# Patient Record
Sex: MALE | Race: WHITE | NOT HISPANIC OR LATINO | Employment: FULL TIME | URBAN - METROPOLITAN AREA
[De-identification: names, ages, dates, MRNs, and addresses within clinical notes are randomized per-mention and may not be internally consistent; named-entity substitution may affect disease eponyms.]

---

## 2017-05-26 ENCOUNTER — TELEPHONE (OUTPATIENT)
Dept: FAMILY MEDICINE | Facility: CLINIC | Age: 47
End: 2017-05-26

## 2017-05-26 NOTE — TELEPHONE ENCOUNTER
Reason for Call:  Other appointment    Detailed comments: wife Lashell called to set up new patient appointment for Darien with Dr Owusu, he was referred by Dr Joss Owusu. Ok to be new patient?      Phone Number Patient can be reached at: Other phone number:  [phone] # for Lashell    Best Time: any    Can we leave a detailed message on this number? YES    Call taken on 5/26/2017 at 9:40 AM by Shima Valverde

## 2017-06-05 ENCOUNTER — OFFICE VISIT (OUTPATIENT)
Dept: FAMILY MEDICINE | Facility: CLINIC | Age: 47
End: 2017-06-05
Payer: COMMERCIAL

## 2017-06-05 VITALS
SYSTOLIC BLOOD PRESSURE: 129 MMHG | HEIGHT: 69 IN | TEMPERATURE: 97.5 F | WEIGHT: 191 LBS | BODY MASS INDEX: 28.29 KG/M2 | HEART RATE: 81 BPM | OXYGEN SATURATION: 97 % | DIASTOLIC BLOOD PRESSURE: 93 MMHG

## 2017-06-05 DIAGNOSIS — R19.7 DIARRHEA, UNSPECIFIED TYPE: ICD-10-CM

## 2017-06-05 DIAGNOSIS — R10.11 RUQ ABDOMINAL PAIN: Primary | ICD-10-CM

## 2017-06-05 LAB
ALBUMIN SERPL-MCNC: 4 G/DL (ref 3.4–5)
ALBUMIN UR-MCNC: NEGATIVE MG/DL
ALP SERPL-CCNC: 84 U/L (ref 40–150)
ALT SERPL W P-5'-P-CCNC: 69 U/L (ref 0–70)
AMYLASE SERPL-CCNC: 29 U/L (ref 30–110)
ANION GAP SERPL CALCULATED.3IONS-SCNC: 7 MMOL/L (ref 3–14)
APPEARANCE UR: CLEAR
AST SERPL W P-5'-P-CCNC: 33 U/L (ref 0–45)
BILIRUB DIRECT SERPL-MCNC: 0.2 MG/DL (ref 0–0.2)
BILIRUB SERPL-MCNC: 0.6 MG/DL (ref 0.2–1.3)
BILIRUB UR QL STRIP: NEGATIVE
BUN SERPL-MCNC: 14 MG/DL (ref 7–30)
CALCIUM SERPL-MCNC: 8.6 MG/DL (ref 8.5–10.1)
CHLORIDE SERPL-SCNC: 107 MMOL/L (ref 94–109)
CO2 SERPL-SCNC: 25 MMOL/L (ref 20–32)
COLOR UR AUTO: YELLOW
CREAT SERPL-MCNC: 0.87 MG/DL (ref 0.66–1.25)
ERYTHROCYTE [DISTWIDTH] IN BLOOD BY AUTOMATED COUNT: 13 % (ref 10–15)
GFR SERPL CREATININE-BSD FRML MDRD: NORMAL ML/MIN/1.7M2
GLUCOSE SERPL-MCNC: 96 MG/DL (ref 70–99)
GLUCOSE UR STRIP-MCNC: NEGATIVE MG/DL
HCT VFR BLD AUTO: 46.9 % (ref 40–53)
HGB BLD-MCNC: 15.7 G/DL (ref 13.3–17.7)
HGB UR QL STRIP: NEGATIVE
KETONES UR STRIP-MCNC: NEGATIVE MG/DL
LEUKOCYTE ESTERASE UR QL STRIP: NEGATIVE
LIPASE SERPL-CCNC: 152 U/L (ref 73–393)
MCH RBC QN AUTO: 30.4 PG (ref 26.5–33)
MCHC RBC AUTO-ENTMCNC: 33.5 G/DL (ref 31.5–36.5)
MCV RBC AUTO: 91 FL (ref 78–100)
NITRATE UR QL: NEGATIVE
PH UR STRIP: 5 PH (ref 5–7)
PLATELET # BLD AUTO: 235 10E9/L (ref 150–450)
POTASSIUM SERPL-SCNC: 4.6 MMOL/L (ref 3.4–5.3)
PROT SERPL-MCNC: 7 G/DL (ref 6.8–8.8)
RBC # BLD AUTO: 5.17 10E12/L (ref 4.4–5.9)
SODIUM SERPL-SCNC: 139 MMOL/L (ref 133–144)
SP GR UR STRIP: 1.01 (ref 1–1.03)
URN SPEC COLLECT METH UR: NORMAL
UROBILINOGEN UR STRIP-ACNC: 0.2 EU/DL (ref 0.2–1)
WBC # BLD AUTO: 6.1 10E9/L (ref 4–11)

## 2017-06-05 PROCEDURE — 81003 URINALYSIS AUTO W/O SCOPE: CPT | Performed by: INTERNAL MEDICINE

## 2017-06-05 PROCEDURE — 80048 BASIC METABOLIC PNL TOTAL CA: CPT | Performed by: INTERNAL MEDICINE

## 2017-06-05 PROCEDURE — 80076 HEPATIC FUNCTION PANEL: CPT | Performed by: INTERNAL MEDICINE

## 2017-06-05 PROCEDURE — 83690 ASSAY OF LIPASE: CPT | Performed by: INTERNAL MEDICINE

## 2017-06-05 PROCEDURE — 85027 COMPLETE CBC AUTOMATED: CPT | Performed by: INTERNAL MEDICINE

## 2017-06-05 PROCEDURE — 82150 ASSAY OF AMYLASE: CPT | Performed by: INTERNAL MEDICINE

## 2017-06-05 PROCEDURE — 99204 OFFICE O/P NEW MOD 45 MIN: CPT | Performed by: INTERNAL MEDICINE

## 2017-06-05 PROCEDURE — 36415 COLL VENOUS BLD VENIPUNCTURE: CPT | Performed by: INTERNAL MEDICINE

## 2017-06-05 RX ORDER — ALBUTEROL SULFATE 90 UG/1
1 AEROSOL, METERED RESPIRATORY (INHALATION) EVERY 4 HOURS PRN
COMMUNITY

## 2017-06-05 NOTE — PATIENT INSTRUCTIONS
(R10.11) RUQ abdominal pain  (primary encounter diagnosis)  Comment: We will plan to get labs checked today and refer for abdominal CT [facility] phone #[phone].  We will send stool sample cultures home with you today.  Bring cultures back at your convenience.  Plan: Lipase, Amylase, Hepatic panel, Basic metabolic        panel, UA reflex to Microscopic and Culture, CT        Abdomen Pelvis w Contrast            (R19.7) Diarrhea, unspecified type  Comment: as above   Plan: Lipase, Amylase, Hepatic panel, Basic metabolic        panel, UA reflex to Microscopic and Culture, CT        Abdomen Pelvis w Contrast

## 2017-06-05 NOTE — MR AVS SNAPSHOT
After Visit Summary   6/5/2017    Darien Johnson    MRN: [id number]           Patient Information     Date Of Birth          1970        Visit Information        Provider Department      6/5/2017 10:00 AM Erich Owusu MD [facility]        Today's Diagnoses     RUQ abdominal pain    -  1    Diarrhea, unspecified type          Care Instructions    (R10.11) RUQ abdominal pain  (primary encounter diagnosis)  Comment: We will plan to get labs checked today and refer for abdominal CT [facility] phone #[phone].  We will send stool sample cultures home with you today.  Bring cultures back at your convenience.  Plan: Lipase, Amylase, Hepatic panel, Basic metabolic        panel, UA reflex to Microscopic and Culture, CT        Abdomen Pelvis w Contrast            (R19.7) Diarrhea, unspecified type  Comment: as above   Plan: Lipase, Amylase, Hepatic panel, Basic metabolic        panel, UA reflex to Microscopic and Culture, CT        Abdomen Pelvis w Contrast                     Follow-ups after your visit        Future tests that were ordered for you today     Open Future Orders        Priority Expected Expires Ordered    Clostridium difficile Toxin B PCR Routine  9/5/2017 6/5/2017    Enteric Bacteria and Virus Panel by MELISA Stool Routine  6/5/2018 6/5/2017    CT Abdomen Pelvis w Contrast Routine  6/5/2018 6/5/2017            Who to contact     If you have questions or need follow up information about today's clinic visit or your schedule please contact [facility] directly at [phone].  Normal or non-critical lab and imaging results will be communicated to you by MyChart, letter or phone within 4 business days after the clinic has received the results. If you do not hear from us within 7 days, please contact the clinic through MyChart or phone. If you have a critical or abnormal lab result, we will notify you by phone as soon as possible.  Submit refill  "requests through Healthkart or call your pharmacy and they will forward the refill request to us. Please allow 3 business days for your refill to be completed.          Additional Information About Your Visit        [employer] Information     Healthkart lets you send messages to your doctor, view your test results, renew your prescriptions, schedule appointments and more. To sign up, go to [email or web address] . Click on \"Log in\" on the left side of the screen, which will take you to the Welcome page. Then click on \"Sign up Now\" on the right side of the page.     You will be asked to enter the access code listed below, as well as some personal information. Please follow the directions to create your username and password.     Your access code is: [email or web address]  Expires: 9/3/2017 10:25 AM     Your access code will  in 90 days. If you need help or a new code, please call your [facility] clinic or [phone].        Care EveryWhere ID     This is your Care EveryWhere ID. This could be used by other organizations to access your [facility] medical records  [id number]        Your Vitals Were     Pulse Temperature Height Pulse Oximetry BMI (Body Mass Index)       81 97.5  F (36.4  C) 5' 9\" (1.753 m) 97% 28.21 kg/m2        Blood Pressure from Last 3 Encounters:   17 (!) 129/93    Weight from Last 3 Encounters:   17 191 lb (86.6 kg)              We Performed the Following     Amylase     Basic metabolic panel     CBC with platelets     Hepatic panel     Lipase     UA reflex to Microscopic and Culture        Primary Care Provider    None Specified       No primary provider on file.        Thank you!     Thank you for choosing [facility]  for your care. Our goal is always to provide you with excellent care. Hearing back from our patients is one way we can continue to improve our services. Please take a few minutes to complete the written survey that you may receive in the mail after your visit with us. " Thank you!             Your Updated Medication List - Protect others around you: Learn how to safely use, store and throw away your medicines at www.disposemymeds.org.          This list is accurate as of: 6/5/17 10:26 AM.  Always use your most recent med list.                   Brand Name Dispense Instructions for use    albuterol 108 (90 BASE) MCG/ACT Inhaler    PROAIR HFA/PROVENTIL HFA/VENTOLIN HFA     Inhale 1 puff into the lungs every 4 hours as needed for shortness of breath / dyspnea or wheezing

## 2017-06-05 NOTE — PROGRESS NOTES
"[facility]  CLINIC PROGRESS NOTE    Subjective:  Establishment of primary care   Darien Johnson presents to the clinic today for establishment of primary care.    Loose stools   Darien Johnson has had two weeks of indigestion, diarrhea and lower energy.  He notes that he has had lower back pain as well and noticeable dark urine.  He relates that his nausea comes and goes and seems to be relatively related to eating.  His stomach is making some noises.  Loose stools initially every 15 minutes and has not had a normal stool since two weeks.  Little feverish.  Slight heart burn.    Past medical history, medications, allergies, social history, family history reviewed and updated in [facility] as of 6/5/2017 .    ROS  CONSTITUTIONAL: + fatigue, possibly 10 pound weight loss  SKIN: no worrisome rashes, no worrisome moles, no worrisome lesions  EYES: no acute vision problems or changes  ENT: no ear problems, no mouth problems, no throat problems  RESP: no significant cough, no shortness of breath  CV: no chest pain, no palpitations, no new or worsening peripheral edema  GI: as above   : dark urine, no frequency, no dysuria, no hematuria  MS: no claudication, no myalgias, no joint aches  PSYCHIATRIC: no changes in mood or affect      Objective:  Vitals  BP (!) 129/93  Pulse 81  Temp 97.5  F (36.4  C)  Ht 5' 9\" (1.753 m)  Wt 191 lb (86.6 kg)  SpO2 97%  BMI 28.21 kg/m2  GEN: Alert Oriented x3 NAD  HEENT: Atraumatic, normocephalic, neck supple, no thyromegaly, negative cervical adenopathy  TM: TM bilaterally pearly and grey with normal light reflex  CV: RRR no murmurs or rubs  PULM: CTA no wheezes or crackles  ABD: right upper quadrant tenderness with palpation, no hepatosplenomegally  SKIN: No visible skin lesion or ulcerations  EXT: 2+ dorsal pedis pulses, no edema bilateral lower extremities  NEURO: Gait and station deferred, No focal neurologic deficits  PSYCH: Mood good, affect mood congruent    Results for " orders placed or performed in visit on 06/05/17 (from the past 24 hour(s))   Lipase   Result Value Ref Range    Lipase 152 73 - 393 U/L   Amylase   Result Value Ref Range    Amylase 29 (L) 30 - 110 U/L   Hepatic panel   Result Value Ref Range    Bilirubin Direct 0.2 0.0 - 0.2 mg/dL    Bilirubin Total 0.6 0.2 - 1.3 mg/dL    Albumin 4.0 3.4 - 5.0 g/dL    Protein Total 7.0 6.8 - 8.8 g/dL    Alkaline Phosphatase 84 40 - 150 U/L    ALT 69 0 - 70 U/L    AST 33 0 - 45 U/L   Basic metabolic panel   Result Value Ref Range    Sodium 139 133 - 144 mmol/L    Potassium 4.6 3.4 - 5.3 mmol/L    Chloride 107 94 - 109 mmol/L    Carbon Dioxide 25 20 - 32 mmol/L    Anion Gap 7 3 - 14 mmol/L    Glucose 96 70 - 99 mg/dL    Urea Nitrogen 14 7 - 30 mg/dL    Creatinine 0.87 0.66 - 1.25 mg/dL    GFR Estimate >90  Non  GFR Calc   >60 mL/min/1.7m2    GFR Estimate If Black >90   GFR Calc   >60 mL/min/1.7m2    Calcium 8.6 8.5 - 10.1 mg/dL   UA reflex to Microscopic and Culture   Result Value Ref Range    Color Urine Yellow     Appearance Urine Clear     Glucose Urine Negative NEG mg/dL    Bilirubin Urine Negative NEG    Ketones Urine Negative NEG mg/dL    Specific Gravity Urine 1.015 1.003 - 1.035    Blood Urine Negative NEG    pH Urine 5.0 5.0 - 7.0 pH    Protein Albumin Urine Negative NEG mg/dL    Urobilinogen Urine 0.2 0.2 - 1.0 EU/dL    Nitrite Urine Negative NEG    Leukocyte Esterase Urine Negative NEG    Source Midstream Urine    CBC with platelets   Result Value Ref Range    WBC 6.1 4.0 - 11.0 10e9/L    RBC Count 5.17 4.4 - 5.9 10e12/L    Hemoglobin 15.7 13.3 - 17.7 g/dL    Hematocrit 46.9 40.0 - 53.0 %    MCV 91 78 - 100 fl    MCH 30.4 26.5 - 33.0 pg    MCHC 33.5 31.5 - 36.5 g/dL    RDW 13.0 10.0 - 15.0 %    Platelet Count 235 150 - 450 10e9/L       Assessment/Plan:  Patient Instructions   (R10.11) RUQ abdominal pain  (primary encounter diagnosis)  Comment: We will plan to get labs checked today and refer  for abdominal CT [facility] phone #[phone].  We will send stool sample cultures home with you today.  Bring cultures back at your convenience.  Plan: Lipase, Amylase, Hepatic panel, Basic metabolic        panel, UA reflex to Microscopic and Culture, CT        Abdomen Pelvis w Contrast            (R19.7) Diarrhea, unspecified type  Comment: as above   Plan: Lipase, Amylase, Hepatic panel, Basic metabolic        panel, UA reflex to Microscopic and Culture, CT        Abdomen Pelvis w Contrast               Follow up pending labs and CT    Disclaimer: This note consists of symbols derived from keyboarding, dictation and/or voice recognition software. As a result, there may be errors in the script that have gone undetected. Please consider this when interpreting information found in this chart.    Erich Owusu MD  [phone]

## 2017-06-05 NOTE — LETTER
[address]  [place] [address]  [phone]                                                                                               Date: 6/6/2017    Darien Johnson                                                                               [address]  [place] [address]              Dear Darien,    I have had the opportunity to review your recent labs and they are as follows:  Your urinalysis returned within normal limits  Your basic metabolic panel shows stable renal function and electrolytes  Your liver function tests also returned within normal limits  Your pancreatic enzymes all came back within normal limits  Enclosed is a copy of your results.      It was a pleasure to see you at your last appointment. If you have any questions, please feel free to call myself or my nurse at [phone].          Sincerely,    Erich Owusu MD/ Yesenia SUGGS CMA

## 2017-06-06 ENCOUNTER — TELEPHONE (OUTPATIENT)
Dept: FAMILY MEDICINE | Facility: CLINIC | Age: 47
End: 2017-06-06

## 2017-06-06 DIAGNOSIS — R19.7 DIARRHEA, UNSPECIFIED TYPE: ICD-10-CM

## 2017-06-06 LAB
C DIFF TOX B STL QL: NORMAL
CAMPYLOBACTER GROUP BY NAT: NOT DETECTED
ENTERIC PATHOGEN COMMENT: NORMAL
NOROVIRUS I AND II BY NAT: NOT DETECTED
ROTAVIRUS A BY NAT: NOT DETECTED
SALMONELLA SPECIES BY NAT: NOT DETECTED
SHIGA TOXIN 1 GENE BY NAT: NOT DETECTED
SHIGA TOXIN 2 GENE BY NAT: NOT DETECTED
SHIGELLA SP+EIEC IPAH STL QL NAA+PROBE: NOT DETECTED
SPECIMEN SOURCE: NORMAL
VIBRIO GROUP BY NAT: NOT DETECTED
YERSINIA ENTEROCOLITICA BY NAT: NOT DETECTED

## 2017-06-06 PROCEDURE — 87493 C DIFF AMPLIFIED PROBE: CPT | Mod: 59 | Performed by: INTERNAL MEDICINE

## 2017-06-06 PROCEDURE — 87506 IADNA-DNA/RNA PROBE TQ 6-11: CPT | Performed by: INTERNAL MEDICINE

## 2017-06-06 NOTE — TELEPHONE ENCOUNTER
Can we call Darien Johnson and let him know that     I have had the opportunity to review your recent labs and they are as follows:  Your urinalysis returned within normal limits  Your basic metabolic panel shows stable renal function and electrolytes  Your liver function tests also returned within normal limits  Your pancreatic enzymes all came back within normal limits    We will await stool cultures and upcoming CT

## 2017-06-07 ENCOUNTER — HOSPITAL ENCOUNTER (OUTPATIENT)
Dept: CT IMAGING | Facility: CLINIC | Age: 47
Discharge: HOME | End: 2017-06-07
Attending: INTERNAL MEDICINE | Admitting: INTERNAL MEDICINE
Payer: COMMERCIAL

## 2017-06-07 DIAGNOSIS — R10.11 RUQ ABDOMINAL PAIN: ICD-10-CM

## 2017-06-07 DIAGNOSIS — R19.7 DIARRHEA, UNSPECIFIED TYPE: ICD-10-CM

## 2017-06-07 PROCEDURE — 25000125 ZZHC RX 250: Performed by: INTERNAL MEDICINE

## 2017-06-07 PROCEDURE — 25000128 H RX IP 250 OP 636: Performed by: INTERNAL MEDICINE

## 2017-06-07 PROCEDURE — 74177 CT ABD & PELVIS W/CONTRAST: CPT

## 2017-06-07 RX ORDER — IOPAMIDOL 755 MG/ML
96 INJECTION, SOLUTION INTRAVASCULAR ONCE
Status: COMPLETED | OUTPATIENT
Start: 2017-06-07 | End: 2017-06-07

## 2017-06-07 RX ADMIN — SODIUM CHLORIDE 69 ML: 9 INJECTION, SOLUTION INTRAVENOUS at 10:43

## 2017-06-07 RX ADMIN — IOPAMIDOL 96 ML: 755 INJECTION, SOLUTION INTRAVENOUS at 10:43

## 2017-06-07 NOTE — TELEPHONE ENCOUNTER
Reason for Call:  Request for results:    Name of test or procedure: Lab Work    Date of test of procedure: 6/6/17    Location of the test or procedure: [facility]    OK to leave the result message on voice mail or with a family member? NO    Phone number Patient can be reached at:  Work number on file:  [phone]    Additional comments: please call before 9:30am or after 1:00pm.  Pt. Has been sick for over a month and really wants to review results with doctor or a nurse    Call taken on 6/7/2017 at 8:36 AM by Rosa Marmolejo.

## 2017-06-07 NOTE — PROGRESS NOTES
Arthur Ledezma,    I have had the opportunity to review your recent results and an interpretation is as follows:  Your stool studies returned within normal limits      Sincerely,  Erich Owusu MD

## 2017-06-09 ENCOUNTER — TELEPHONE (OUTPATIENT)
Dept: FAMILY MEDICINE | Facility: CLINIC | Age: 47
End: 2017-06-09

## 2017-06-09 DIAGNOSIS — R19.7 DIARRHEA, UNSPECIFIED TYPE: Primary | ICD-10-CM

## 2017-06-09 NOTE — TELEPHONE ENCOUNTER
Reason for Call:  Other call back    Detailed comments: pt would like to speak with nurse in regards to ct results. Please give him a call. States doesn't want to wait the whole weekend before hearing about these results. Thanks!    Phone Number Patient can be reached at: other-[phone]    Best Time: anytime    Can we leave a detailed message on this number? YES    Call taken on 6/9/2017 at 2:28 PM by Brianna Wren

## 2017-06-09 NOTE — TELEPHONE ENCOUNTER
This has not yet been resulted by a provider. Routing to [employer] or on-call    Catarino Camejo CMA

## 2017-06-12 NOTE — TELEPHONE ENCOUNTER
I spoke to Darien Johnson and although his symptoms had improved prior to the weekend, his diarrhea did return and as such I recommended he schedule a consultation with gastroenterology and ultimately he was advised that he will need a colonoscopy this summer.    Erich Owusu MD

## 2017-06-13 NOTE — PROGRESS NOTES
Arthur Ledezma,    I have had the opportunity to review your recent results and an interpretation is as follows:  As we discussed a follow up in gastroenterology is recommended     [facility] [phone] to schedule     Sincerely,  Erich Owusu MD

## 2017-06-14 ENCOUNTER — TELEPHONE (OUTPATIENT)
Dept: FAMILY MEDICINE | Facility: CLINIC | Age: 47
End: 2017-06-14

## 2017-06-14 DIAGNOSIS — M10.9 ACUTE GOUTY ARTHRITIS: Primary | ICD-10-CM

## 2017-06-15 PROBLEM — M10.9 ACUTE GOUTY ARTHRITIS: Status: ACTIVE | Noted: 2017-06-15

## 2017-06-15 RX ORDER — INDOMETHACIN 25 MG/1
25 CAPSULE ORAL 2 TIMES DAILY WITH MEALS
Qty: 42 CAPSULE | Refills: 1 | COMMUNITY
Start: 2017-06-15 | End: 2017-06-20

## 2017-06-16 ENCOUNTER — TRANSFERRED RECORDS (OUTPATIENT)
Dept: HEALTH INFORMATION MANAGEMENT | Facility: CLINIC | Age: 47
End: 2017-06-16

## 2017-06-20 ENCOUNTER — OFFICE VISIT (OUTPATIENT)
Dept: FAMILY MEDICINE | Facility: CLINIC | Age: 47
End: 2017-06-20
Payer: COMMERCIAL

## 2017-06-20 ENCOUNTER — MYC MEDICAL ADVICE (OUTPATIENT)
Dept: FAMILY MEDICINE | Facility: CLINIC | Age: 47
End: 2017-06-20

## 2017-06-20 VITALS
SYSTOLIC BLOOD PRESSURE: 128 MMHG | TEMPERATURE: 97.6 F | BODY MASS INDEX: 27.7 KG/M2 | HEIGHT: 69 IN | OXYGEN SATURATION: 96 % | HEART RATE: 90 BPM | DIASTOLIC BLOOD PRESSURE: 92 MMHG | WEIGHT: 187 LBS

## 2017-06-20 DIAGNOSIS — M10.9 ACUTE GOUTY ARTHRITIS: Primary | ICD-10-CM

## 2017-06-20 DIAGNOSIS — M10.9 ACUTE GOUTY ARTHRITIS: ICD-10-CM

## 2017-06-20 PROCEDURE — 84550 ASSAY OF BLOOD/URIC ACID: CPT | Performed by: INTERNAL MEDICINE

## 2017-06-20 PROCEDURE — 36415 COLL VENOUS BLD VENIPUNCTURE: CPT | Performed by: INTERNAL MEDICINE

## 2017-06-20 PROCEDURE — 99213 OFFICE O/P EST LOW 20 MIN: CPT | Performed by: INTERNAL MEDICINE

## 2017-06-20 RX ORDER — PREDNISONE 20 MG/1
TABLET ORAL
Qty: 12 TABLET | Refills: 0 | Status: SHIPPED | OUTPATIENT
Start: 2017-06-20 | End: 2017-06-26

## 2017-06-20 RX ORDER — INDOMETHACIN 25 MG/1
25-50 CAPSULE ORAL
Qty: 42 CAPSULE | Refills: 1 | Status: SHIPPED | OUTPATIENT
Start: 2017-06-20

## 2017-06-20 NOTE — PROGRESS NOTES
"[facility]  CLINIC PROGRESS NOTE    Subjective:  Gout   Has had gout in the left big toe in the past (two years ago) following a torn calf muscle.  He has not gout since then.  Most recently he had an occurrence in the same left big toe that started 8 days ago.  He tried to work though it and was on his feet most days.  He was taking 50 mg of indomethacin three times daily over the past week.  He tried soaking his foot in bathtub with warm water.   He has been drinking water.   He recalls drinking beer the weekend before.  He has been trying to cut down on steak.        Past medical history, medications, allergies, social history, family history reviewed and updated in [facility] as of 6/20/2017 .    ROS  CONSTITUTIONAL: no fatigue, no unexpected change in weight  SKIN: No break in the skin  EYES: no acute vision problems or changes  ENT: no ear problems, no mouth problems, no throat problems  RESP: no significant cough, no shortness of breath  CV: no chest pain, no palpitations, no new or worsening peripheral edema  GI: Recover from acute viral gastroenteritis  : no frequency, no dysuria, no hematuria  MS: gout in left foot  PSYCHIATRIC: no changes in mood or affect      Objective:  Vitals  BP (!) 128/92 (BP Location: Right arm, Patient Position: Chair, Cuff Size: Adult Regular)  Pulse 90  Temp 97.6  F (36.4  C) (Tympanic)  Ht 5' 9\" (1.753 m)  Wt 187 lb (84.8 kg)  SpO2 96%  BMI 27.62 kg/m2  GEN: Alert Oriented x3 NAD  HEENT: Atraumatic, normocephalic,  CV: RRR no murmurs or rubs  PULM: CTA no wheezes or crackles  SKIN: Left foot with swelling and erythema  Musculoskeletal: Left foot increased edema and tenderness with slight palpation throughout the foot  EXT: 2+ dorsal pedis pulses, no edema bilateral lower extremities  NEURO: Gait and station deferred, No focal neurologic deficits  PSYCH: Mood good, affect mood congruent    No results found for this or any previous visit (from the past 24 " hour(s)).    Assessment/Plan:  Patient Instructions   (M10.9) Acute gouty arthritis  (primary encounter diagnosis)  Comment: We will treat empirically for acute flare of gout with prednisone.  Continue to stay hydrated.  Avoid use of indomethacin while taking steroids.  Acetaminophen can be prescribed.  We will plan to follow up to review your uric acid level and discuss options for use of allopurinol in the future  Plan: predniSONE (DELTASONE) 20 MG tablet, Uric acid             15 minutes spent with patient.  Over 50% of time counseling    Follow up in gastroenterology     Disclaimer: This note consists of symbols derived from keyboarding, dictation and/or voice recognition software. As a result, there may be errors in the script that have gone undetected. Please consider this when interpreting information found in this chart.    Erich Owusu MD  [phone]

## 2017-06-20 NOTE — PATIENT INSTRUCTIONS
(M10.9) Acute gouty arthritis  (primary encounter diagnosis)  Comment: We will treat empirically for acute flare of gout with prednisone.  Continue to stay hydrated.  Avoid use of indomethacin while taking steroids.  Acetaminophen can be prescribed.  We will plan to follow up to review your uric acid level and discuss options for use of allopurinol in the future  Plan: predniSONE (DELTASONE) 20 MG tablet, Uric acid

## 2017-06-20 NOTE — NURSING NOTE
"Chief Complaint   Patient presents with     RECHECK       Initial BP (!) 128/92 (BP Location: Right arm, Patient Position: Chair, Cuff Size: Adult Regular)  Pulse 90  Temp 97.6  F (36.4  C) (Tympanic)  Ht 5' 9\" (1.753 m)  Wt 187 lb (84.8 kg)  SpO2 96%  BMI 27.62 kg/m2 Estimated body mass index is 27.62 kg/(m^2) as calculated from the following:    Height as of this encounter: 5' 9\" (1.753 m).    Weight as of this encounter: 187 lb (84.8 kg).  Medication Reconciliation: complete   Carmen Laws- RODNEY      "

## 2017-06-20 NOTE — MR AVS SNAPSHOT
After Visit Summary   6/20/2017    Darien Johnson    MRN: [id number]           Patient Information     Date Of Birth          1970        Visit Information        Provider Department      6/20/2017 2:00 PM Erich Owusu MD [facility]        Today's Diagnoses     Acute gouty arthritis    -  1      Care Instructions    (M10.9) Acute gouty arthritis  (primary encounter diagnosis)  Comment: We will treat empirically for acute flare of gout with prednisone.  Continue to stay hydrated.  Avoid use of indomethacin while taking steroids.  Acetaminophen can be prescribed.  We will plan to follow up to review your uric acid level and discuss options for use of allopurinol in the future  Plan: predniSONE (DELTASONE) 20 MG tablet, Uric acid                     Follow-ups after your visit        Who to contact     If you have questions or need follow up information about today's clinic visit or your schedule please contact [facility] directly at [phone].  Normal or non-critical lab and imaging results will be communicated to you by [employer]or[employer], letter or phone within 4 business days after the clinic has received the results. If you do not hear from us within 7 days, please contact the clinic through [employer]or[employer] or phone. If you have a critical or abnormal lab result, we will notify you by phone as soon as possible.  Submit refill requests through [email or web address] or call your pharmacy and they will forward the refill request to us. Please allow 3 business days for your refill to be completed.          Additional Information About Your Visit        [employer] Information     [email or web address] gives you secure access to your electronic health record. If you see a primary care provider, you can also send messages to your care team and make appointments. If you have questions, please call your primary care clinic.  If you do not have a primary care provider, please call [phone] and they will assist  "you.        Care EveryWhere ID     This is your Care EveryWhere ID. This could be used by other organizations to access your [facility] medical records  [id number]        Your Vitals Were     Pulse Temperature Height Pulse Oximetry BMI (Body Mass Index)       90 97.6  F (36.4  C) (Tympanic) 5' 9\" (1.753 m) 96% 27.62 kg/m2        Blood Pressure from Last 3 Encounters:   06/20/17 (!) 128/92   06/05/17 (!) 129/93    Weight from Last 3 Encounters:   06/20/17 187 lb (84.8 kg)   06/05/17 191 lb (86.6 kg)              We Performed the Following     Uric acid          Today's Medication Changes          These changes are accurate as of: 6/20/17  2:48 PM.  If you have any questions, ask your nurse or doctor.               Start taking these medicines.        Dose/Directions    predniSONE 20 MG tablet   Commonly known as:  DELTASONE   Used for:  Acute gouty arthritis   Started by:  Erich Owusu MD        2 tabs (40 mg) daily x 3 days, 1 tab (20 mg) daily x 3 days, then 1/2 tab (10 mg) x 3 days.   Quantity:  12 tablet   Refills:  0         These medicines have changed or have updated prescriptions.        Dose/Directions    indomethacin 25 MG capsule   Commonly known as:  INDOCIN   This may have changed:    - how much to take  - when to take this   Used for:  Acute gouty arthritis   Changed by:  Erich Owusu MD        Dose:  25-50 mg   Take 1-2 capsules (25-50 mg) by mouth 3 times daily (with meals)   Quantity:  42 capsule   Refills:  1            Where to get your medicines      These medications were sent to [id number] - [place] - [address] [place] AT [facility] [address]  [address] [facility] [place], [place] [address]     Phone:  [phone]     indomethacin 25 MG capsule    predniSONE 20 MG tablet                Primary Care Provider Office Phone # Fax #    Erich Owusu [phone]       [place] [address] [address] " [address] [address]        Thank you!     Thank you for choosing [facility]  for your care. Our goal is always to provide you with excellent care. Hearing back from our patients is one way we can continue to improve our services. Please take a few minutes to complete the written survey that you may receive in the mail after your visit with us. Thank you!             Your Updated Medication List - Protect others around you: Learn how to safely use, store and throw away your medicines at www.disposemymeds.org.          This list is accurate as of: 6/20/17  2:48 PM.  Always use your most recent med list.                   Brand Name Dispense Instructions for use    albuterol 108 (90 BASE) MCG/ACT Inhaler    PROAIR HFA/PROVENTIL HFA/VENTOLIN HFA     Inhale 1 puff into the lungs every 4 hours as needed for shortness of breath / dyspnea or wheezing       indomethacin 25 MG capsule    INDOCIN    42 capsule    Take 1-2 capsules (25-50 mg) by mouth 3 times daily (with meals)       predniSONE 20 MG tablet    DELTASONE    12 tablet    2 tabs (40 mg) daily x 3 days, 1 tab (20 mg) daily x 3 days, then 1/2 tab (10 mg) x 3 days.

## 2017-06-21 LAB — URATE SERPL-MCNC: 7.6 MG/DL (ref 3.5–7.2)

## 2017-06-21 NOTE — PROGRESS NOTES
Arthur Ledezma,    I have had the opportunity to review your recent results and an interpretation is as follows:  Your uric acid level was elevated consistent with your diagnosis of gout.  We could consider adding allopurinol to help prevent future gout attacks    Sincerely,  Erich Owusu MD

## 2017-06-24 ENCOUNTER — MYC MEDICAL ADVICE (OUTPATIENT)
Dept: FAMILY MEDICINE | Facility: CLINIC | Age: 47
End: 2017-06-24

## 2017-06-24 DIAGNOSIS — M10.9 ACUTE GOUTY ARTHRITIS: ICD-10-CM

## 2017-06-25 ENCOUNTER — MYC MEDICAL ADVICE (OUTPATIENT)
Dept: FAMILY MEDICINE | Facility: CLINIC | Age: 47
End: 2017-06-25

## 2017-06-26 RX ORDER — PREDNISONE 20 MG/1
TABLET ORAL
Qty: 12 TABLET | Refills: 0 | Status: SHIPPED | OUTPATIENT
Start: 2017-06-26

## 2017-06-26 NOTE — TELEPHONE ENCOUNTER
PCP: Please see mychart message below regarding prednisone and on-going gout symptoms.     Please advise.     Meena Choe RN

## 2017-06-27 ENCOUNTER — TELEPHONE (OUTPATIENT)
Dept: FAMILY MEDICINE | Facility: CLINIC | Age: 47
End: 2017-06-27

## 2017-06-27 NOTE — TELEPHONE ENCOUNTER
PCP:    Huddled with Zina Moore and advised as noted below.  Ok for Patient to take Percocet 1 every 6 hours over night tonight and then be seen in the clinic tomorrow.    Called the Patient back and spoke with the wife.  TEAM appointment scheduled for tomorrow at 11am  Advised to be seen in ER if the pain worsens  Advised to call back if further questions or concerns.    Shanel Dhillon RN

## 2017-06-27 NOTE — TELEPHONE ENCOUNTER
PCP:    Patient is having intense pain from gout today.   Thinks he may have over done it walking today.  Was rating the pain 12/10, has had the leg elevated for an hour and now the pain is down to 8/10.    The left toe/foot/ankle are swollen  Is on the second fill of prednisone.    The Patient has an rx of Percocet 325/5mg left over from a 2016 gout flare.  Directions are to take one tab every 6 hours.    Wants to know if he can take that this evening.  Will continue the Prednisone  And then be seen in clinic tomorrow.    Please advise  Thank you    Shanel Dhillon RN

## 2017-06-27 NOTE — TELEPHONE ENCOUNTER
Reason for Call:  Other gout    Detailed comments: Wife Lashell calling . Patient still having lots of pain from gout in his foot.  He is not sure what to take now for it, or if he should be seen again,  Please advise.     Phone Number Patient can be reached at: Cell number on file:    Telephone Information:   Mobile [phone]   Cell for patient    Best Time: asap    Can we leave a detailed message on this number? YES    Call taken on 6/27/2017 at 4:13 PM by Shima Valverde

## 2017-06-28 ENCOUNTER — MYC MEDICAL ADVICE (OUTPATIENT)
Dept: FAMILY MEDICINE | Facility: CLINIC | Age: 47
End: 2017-06-28

## 2017-06-28 ENCOUNTER — MEDICAL CORRESPONDENCE (OUTPATIENT)
Dept: HEALTH INFORMATION MANAGEMENT | Facility: CLINIC | Age: 47
End: 2017-06-28

## 2017-06-28 DIAGNOSIS — M10.9 ACUTE GOUTY ARTHRITIS: Primary | ICD-10-CM

## 2017-06-28 NOTE — TELEPHONE ENCOUNTER
I called Darien Johnson and recommended he keep off of the foot for 3 days and increase the dose of prednisone to 40 mg then taper over 8 days.  He also was given a phone number for rheum to call to schedule    Erich Owusu MD

## 2017-06-28 NOTE — TELEPHONE ENCOUNTER
PCP:    Pt had appointment today at 11:00am but cancelled via [employer]. Please review note below and advise.     Heidi Farooq RN

## 2017-06-28 NOTE — TELEPHONE ENCOUNTER
Reason for call:  Patient reporting a symptom    Symptom or request: Gout     Duration (how long have symptoms been present): Has gotten worse     Have you been treated for this before? Yes    Additional comments: Darien Johnson called and said that he is having a hard time walking and it is hard for him to travel anywhere.     Phone Number patient can be reached at:  Cell number on file:    Telephone Information:   Mobile [phone]       Best Time:  ASAP     Can we leave a detailed message on this number:  YES    Call taken on 6/28/2017 at 8:50 AM by Rosina Bright

## 2018-06-18 ENCOUNTER — MYC MEDICAL ADVICE (OUTPATIENT)
Dept: FAMILY MEDICINE | Facility: CLINIC | Age: 48
End: 2018-06-18

## 2018-06-18 NOTE — TELEPHONE ENCOUNTER
PCP   See patient's question regarding another patient.   Please advise (unsure of patient's full info to pull up her chart).     Jody SUGGS RN

## 2018-10-05 ENCOUNTER — TRANSFERRED RECORDS (OUTPATIENT)
Dept: HEALTH INFORMATION MANAGEMENT | Facility: CLINIC | Age: 48
End: 2018-10-05

## 2018-10-13 ENCOUNTER — HEALTH MAINTENANCE LETTER (OUTPATIENT)
Age: 48
End: 2018-10-13

## 2019-01-02 ENCOUNTER — MYC REFILL (OUTPATIENT)
Dept: FAMILY MEDICINE | Facility: CLINIC | Age: 49
End: 2019-01-02

## 2019-01-02 ENCOUNTER — MYC MEDICAL ADVICE (OUTPATIENT)
Dept: FAMILY MEDICINE | Facility: CLINIC | Age: 49
End: 2019-01-02

## 2019-01-02 DIAGNOSIS — M10.9 ACUTE GOUTY ARTHRITIS: ICD-10-CM

## 2019-01-02 RX ORDER — INDOMETHACIN 25 MG/1
25-50 CAPSULE ORAL
Qty: 42 CAPSULE | Refills: 1 | Status: CANCELLED | OUTPATIENT
Start: 2019-01-02

## 2019-01-02 NOTE — TELEPHONE ENCOUNTER
FYI PCP   Advised e-visit to patient as he is requesting a script that is not actively on his med list    If you don't feel e-visit is appropriate for this please let triage know    Thanks  Jody SUGGS RN

## 2019-01-03 NOTE — TELEPHONE ENCOUNTER
Last seen 6/20/17.  Ayla message sent to patient asking him to schedule appointment.  Amina Robbins RN    Request for Indocin.  Labs not current.

## 2019-10-02 ENCOUNTER — HEALTH MAINTENANCE LETTER (OUTPATIENT)
Age: 49
End: 2019-10-02

## 2021-01-15 ENCOUNTER — HEALTH MAINTENANCE LETTER (OUTPATIENT)
Age: 51
End: 2021-01-15

## 2021-09-05 ENCOUNTER — HEALTH MAINTENANCE LETTER (OUTPATIENT)
Age: 51
End: 2021-09-05

## 2022-02-19 ENCOUNTER — HEALTH MAINTENANCE LETTER (OUTPATIENT)
Age: 52
End: 2022-02-19

## 2022-10-22 ENCOUNTER — HEALTH MAINTENANCE LETTER (OUTPATIENT)
Age: 52
End: 2022-10-22

## 2023-04-01 ENCOUNTER — HEALTH MAINTENANCE LETTER (OUTPATIENT)
Age: 53
End: 2023-04-01

## 2024-10-16 ENCOUNTER — HOSPITAL ENCOUNTER (EMERGENCY)
Facility: CLINIC | Age: 54
Discharge: HOME | End: 2024-10-16
Attending: EMERGENCY MEDICINE | Admitting: EMERGENCY MEDICINE
Payer: COMMERCIAL

## 2024-10-16 ENCOUNTER — APPOINTMENT (OUTPATIENT)
Dept: GENERAL RADIOLOGY | Facility: CLINIC | Age: 54
End: 2024-10-16
Attending: EMERGENCY MEDICINE
Payer: COMMERCIAL

## 2024-10-16 ENCOUNTER — ORDERS ONLY (AUTO-RELEASED) (OUTPATIENT)
Dept: EMERGENCY MEDICINE | Facility: CLINIC | Age: 54
End: 2024-10-16

## 2024-10-16 VITALS
HEART RATE: 82 BPM | SYSTOLIC BLOOD PRESSURE: 156 MMHG | BODY MASS INDEX: 25.9 KG/M2 | WEIGHT: 185 LBS | DIASTOLIC BLOOD PRESSURE: 88 MMHG | RESPIRATION RATE: 18 BRPM | HEIGHT: 71 IN | OXYGEN SATURATION: 94 % | TEMPERATURE: 98.3 F

## 2024-10-16 DIAGNOSIS — R00.0 TACHYCARDIA: ICD-10-CM

## 2024-10-16 DIAGNOSIS — R07.9 CHEST PAIN, UNSPECIFIED TYPE: ICD-10-CM

## 2024-10-16 DIAGNOSIS — R06.02 SOB (SHORTNESS OF BREATH): ICD-10-CM

## 2024-10-16 LAB
ALBUMIN SERPL BCG-MCNC: 4.5 G/DL (ref 3.5–5.2)
ALBUMIN UR-MCNC: NEGATIVE MG/DL
ALP SERPL-CCNC: 78 U/L (ref 40–150)
ALT SERPL W P-5'-P-CCNC: 47 U/L (ref 0–70)
ANION GAP SERPL CALCULATED.3IONS-SCNC: 10 MMOL/L (ref 7–15)
APPEARANCE UR: CLEAR
AST SERPL W P-5'-P-CCNC: 34 U/L (ref 0–45)
ATRIAL RATE - MUSE: 81 BPM
BASOPHILS # BLD AUTO: 0 10E3/UL (ref 0–0.2)
BASOPHILS NFR BLD AUTO: 1 %
BILIRUB SERPL-MCNC: 0.7 MG/DL
BILIRUB UR QL STRIP: NEGATIVE
BUN SERPL-MCNC: 14.2 MG/DL (ref 6–20)
CALCIUM SERPL-MCNC: 9.7 MG/DL (ref 8.8–10.4)
CHLORIDE SERPL-SCNC: 103 MMOL/L (ref 98–107)
COLOR UR AUTO: NORMAL
CREAT SERPL-MCNC: 0.89 MG/DL (ref 0.67–1.17)
D DIMER PPP FEU-MCNC: 0.38 UG/ML FEU (ref 0–0.5)
DIASTOLIC BLOOD PRESSURE - MUSE: NORMAL MMHG
EGFRCR SERPLBLD CKD-EPI 2021: >90 ML/MIN/1.73M2
EOSINOPHIL # BLD AUTO: 0.1 10E3/UL (ref 0–0.7)
EOSINOPHIL NFR BLD AUTO: 1 %
ERYTHROCYTE [DISTWIDTH] IN BLOOD BY AUTOMATED COUNT: 12.9 % (ref 10–15)
FLUAV RNA SPEC QL NAA+PROBE: NEGATIVE
FLUBV RNA RESP QL NAA+PROBE: NEGATIVE
GLUCOSE SERPL-MCNC: 135 MG/DL (ref 70–99)
GLUCOSE UR STRIP-MCNC: NEGATIVE MG/DL
HCO3 SERPL-SCNC: 25 MMOL/L (ref 22–29)
HCT VFR BLD AUTO: 49.3 % (ref 40–53)
HGB BLD-MCNC: 16.2 G/DL (ref 13.3–17.7)
HGB UR QL STRIP: NEGATIVE
IMM GRANULOCYTES # BLD: 0 10E3/UL
IMM GRANULOCYTES NFR BLD: 0 %
INTERPRETATION ECG - MUSE: NORMAL
KETONES UR STRIP-MCNC: NEGATIVE MG/DL
LEUKOCYTE ESTERASE UR QL STRIP: NEGATIVE
LYMPHOCYTES # BLD AUTO: 1.3 10E3/UL (ref 0.8–5.3)
LYMPHOCYTES NFR BLD AUTO: 21 %
MCH RBC QN AUTO: 30.5 PG (ref 26.5–33)
MCHC RBC AUTO-ENTMCNC: 32.9 G/DL (ref 31.5–36.5)
MCV RBC AUTO: 93 FL (ref 78–100)
MONOCYTES # BLD AUTO: 0.5 10E3/UL (ref 0–1.3)
MONOCYTES NFR BLD AUTO: 8 %
NEUTROPHILS # BLD AUTO: 4.1 10E3/UL (ref 1.6–8.3)
NEUTROPHILS NFR BLD AUTO: 68 %
NITRATE UR QL: NEGATIVE
NRBC # BLD AUTO: 0 10E3/UL
NRBC BLD AUTO-RTO: 0 /100
P AXIS - MUSE: 16 DEGREES
PH UR STRIP: 6.5 [PH] (ref 5–7)
PLATELET # BLD AUTO: 152 10E3/UL (ref 150–450)
POTASSIUM SERPL-SCNC: 4.4 MMOL/L (ref 3.4–5.3)
PR INTERVAL - MUSE: 154 MS
PROT SERPL-MCNC: 7.1 G/DL (ref 6.4–8.3)
QRS DURATION - MUSE: 82 MS
QT - MUSE: 372 MS
QTC - MUSE: 432 MS
R AXIS - MUSE: 3 DEGREES
RBC # BLD AUTO: 5.31 10E6/UL (ref 4.4–5.9)
RBC URINE: 0 /HPF
RSV RNA SPEC NAA+PROBE: NEGATIVE
SARS-COV-2 RNA RESP QL NAA+PROBE: NEGATIVE
SODIUM SERPL-SCNC: 138 MMOL/L (ref 135–145)
SP GR UR STRIP: 1 (ref 1–1.03)
SYSTOLIC BLOOD PRESSURE - MUSE: NORMAL MMHG
T AXIS - MUSE: 37 DEGREES
TROPONIN T SERPL HS-MCNC: <6 NG/L
TROPONIN T SERPL HS-MCNC: <6 NG/L
TSH SERPL DL<=0.005 MIU/L-ACNC: 1.61 UIU/ML (ref 0.3–4.2)
UROBILINOGEN UR STRIP-MCNC: NORMAL MG/DL
VENTRICULAR RATE- MUSE: 81 BPM
WBC # BLD AUTO: 6 10E3/UL (ref 4–11)
WBC URINE: 1 /HPF

## 2024-10-16 PROCEDURE — 81001 URINALYSIS AUTO W/SCOPE: CPT | Performed by: EMERGENCY MEDICINE

## 2024-10-16 PROCEDURE — 84484 ASSAY OF TROPONIN QUANT: CPT | Performed by: EMERGENCY MEDICINE

## 2024-10-16 PROCEDURE — 250N000013 HC RX MED GY IP 250 OP 250 PS 637: Performed by: EMERGENCY MEDICINE

## 2024-10-16 PROCEDURE — 84443 ASSAY THYROID STIM HORMONE: CPT | Performed by: EMERGENCY MEDICINE

## 2024-10-16 PROCEDURE — 71046 X-RAY EXAM CHEST 2 VIEWS: CPT

## 2024-10-16 PROCEDURE — 36415 COLL VENOUS BLD VENIPUNCTURE: CPT | Performed by: EMERGENCY MEDICINE

## 2024-10-16 PROCEDURE — 99285 EMERGENCY DEPT VISIT HI MDM: CPT | Mod: 25

## 2024-10-16 PROCEDURE — 85004 AUTOMATED DIFF WBC COUNT: CPT | Performed by: EMERGENCY MEDICINE

## 2024-10-16 PROCEDURE — 80053 COMPREHEN METABOLIC PANEL: CPT | Performed by: EMERGENCY MEDICINE

## 2024-10-16 PROCEDURE — 85379 FIBRIN DEGRADATION QUANT: CPT | Performed by: EMERGENCY MEDICINE

## 2024-10-16 PROCEDURE — 93005 ELECTROCARDIOGRAM TRACING: CPT

## 2024-10-16 PROCEDURE — 87637 SARSCOV2&INF A&B&RSV AMP PRB: CPT | Performed by: EMERGENCY MEDICINE

## 2024-10-16 RX ORDER — NITROGLYCERIN 0.4 MG/1
0.4 TABLET SUBLINGUAL EVERY 5 MIN PRN
Status: COMPLETED | OUTPATIENT
Start: 2024-10-16 | End: 2024-10-16

## 2024-10-16 RX ADMIN — NITROGLYCERIN 0.4 MG: 0.4 TABLET SUBLINGUAL at 10:57

## 2024-10-16 RX ADMIN — NITROGLYCERIN 0.4 MG: 0.4 TABLET SUBLINGUAL at 10:48

## 2024-10-16 RX ADMIN — NITROGLYCERIN 0.4 MG: 0.4 TABLET SUBLINGUAL at 10:43

## 2024-10-16 ASSESSMENT — ACTIVITIES OF DAILY LIVING (ADL)
ADLS_ACUITY_SCORE: 35
ADLS_ACUITY_SCORE: 33

## 2024-10-16 NOTE — ED TRIAGE NOTES
Got off 3 hour flights yesterday.  SOB started suddenly at 0900.  Numbness in fingers on both hands.

## 2024-10-16 NOTE — ED PROVIDER NOTES
Emergency Department Note      History of Present Illness     Chief Complaint   Shortness of Breath      HPI   Darien Johnson is a 54 year old male with history of asthma who presents to the ED for shortness of breath. The patient returned yesterday from a trip to [place] via a 3 hour flight. The patient notes that his smart watch alerted him that he was slightly tachycardic and hypoxic. Though the patient felt fine after the flight and waking up this morning. Later today, as the patient was walking into work, he suddenly developed shortness of breath accompanied by a sick feeling in his stomach and bilateral finger numbness. He states that his shortness of breath feels different from his asthma, especially since he isn't getting over being sick. The patient also endorses a mild cough and a little chest tightness with inhalation. The patient came straight to the ED. Denies fever, rhinorrhea, sore throat, diarrhea, urinary issues, chest pain, neck pain, shoulder pain, jaw pain, leg pain or swelling, and history of DVT or PE. The patient is noted to takes allopurinol and indomethacin.     Independent Historian   None    Review of External Notes   I reviewed his office visit on July 10 for hypertension.    Past Medical History     Medical History and Problem List   Asthma  Hypertension   Gout   Rosacea    Medications   Allopurinol  Indomethacin  Colchicine   Amlodipine  Minocycline   Albuterol     Surgical History   Vasectomy     Physical Exam     Patient Vitals for the past 24 hrs:   BP Temp Temp src Pulse Resp SpO2 Height Weight   10/16/24 1329 (!) 156/88 98.3  F (36.8  C) Oral 82 18 94 % -- --   10/16/24 1155 113/83 98.4  F (36.9  C) Oral 96 20 94 % -- --   10/16/24 1102 120/88 -- -- 111 15 93 % -- --   10/16/24 1056 (!) 135/98 -- -- 112 13 94 % -- --   10/16/24 1055 (!) 126/103 -- -- 116 12 94 % -- --   10/16/24 1050 (!) 153/95 -- -- 115 15 95 % -- --   10/16/24 1040 (!) 142/109 -- -- 88 15 95 % -- --   10/16/24  "1027 (!) 143/96 -- -- 77 16 96 % -- --   10/16/24 1019 (!) 150/112 -- -- 85 19 -- -- --   10/16/24 1009 (!) 168/103 97.4  F (36.3  C) Temporal 87 18 99 % 1.798 m (5' 10.8\") 83.9 kg (185 lb)     Physical Exam  Constitutional: Vital signs reviewed as above. Tired appearing.  General: Alert  HEENT: Moist mucous membranes  Eyes: Conjunctiva normal.   Neck: Normal range of motion  Cardiovascular: Regular rate, Regular rhythm and normal heart sounds.  No MRG  Pulmonary/Chest: Effort normal and breath sounds normal. No respiratory distress. Patient has no wheezes. Patient has no rales. No chest wall tenderness.  Abdominal: Soft. Positive bowel sounds. No MRG.  Musculoskeletal/Extremities: Full ROM. No calf swelling or tenderness.  Endo: No pitting edema  Neurological: Alert, no focal deficits.  Skin: Skin is warm and dry.   Psychiatric: Pleasant      Diagnostics     Lab Results   Labs Ordered and Resulted from Time of ED Arrival to Time of ED Departure   COMPREHENSIVE METABOLIC PANEL - Abnormal       Result Value    Sodium 138      Potassium 4.4      Carbon Dioxide (CO2) 25      Anion Gap 10      Urea Nitrogen 14.2      Creatinine 0.89      GFR Estimate >90      Calcium 9.7      Chloride 103      Glucose 135 (*)     Alkaline Phosphatase 78      AST 34      ALT 47      Protein Total 7.1      Albumin 4.5      Bilirubin Total 0.7     D DIMER QUANTITATIVE - Normal    D-Dimer Quantitative 0.38     TROPONIN T, HIGH SENSITIVITY - Normal    Troponin T, High Sensitivity <6     TSH WITH FREE T4 REFLEX - Normal    TSH 1.61     INFLUENZA A/B, RSV, & SARS-COV2 PCR - Normal    Influenza A PCR Negative      Influenza B PCR Negative      RSV PCR Negative      SARS CoV2 PCR Negative     ROUTINE UA WITH MICROSCOPIC REFLEX TO CULTURE - Normal    Color Urine Straw      Appearance Urine Clear      Glucose Urine Negative      Bilirubin Urine Negative      Ketones Urine Negative      Specific Gravity Urine 1.005      Blood Urine Negative      pH " Urine 6.5      Protein Albumin Urine Negative      Urobilinogen Urine Normal      Nitrite Urine Negative      Leukocyte Esterase Urine Negative      RBC Urine 0      WBC Urine 1     TROPONIN T, HIGH SENSITIVITY - Normal    Troponin T, High Sensitivity <6     CBC WITH PLATELETS AND DIFFERENTIAL    WBC Count 6.0      RBC Count 5.31      Hemoglobin 16.2      Hematocrit 49.3      MCV 93      MCH 30.5      MCHC 32.9      RDW 12.9      Platelet Count 152      % Neutrophils 68      % Lymphocytes 21      % Monocytes 8      % Eosinophils 1      % Basophils 1      % Immature Granulocytes 0      NRBCs per 100 WBC 0      Absolute Neutrophils 4.1      Absolute Lymphocytes 1.3      Absolute Monocytes 0.5      Absolute Eosinophils 0.1      Absolute Basophils 0.0      Absolute Immature Granulocytes 0.0      Absolute NRBCs 0.0         Imaging   Chest XR,  PA & LAT   Final Result   IMPRESSION: No acute cardiopulmonary disease.      JIMMY AMARO MD            SYSTEM ID:  [id number]          EKG   ECG taken at 1003, ECG read at 1007  Normal sinus rhythm   Rate 81 bpm. ID interval 154 ms. QRS duration 82 ms. QT/QTc 372/432 ms. P-R-T axes 16 3 37.    Independent Interpretation   CXR: No pneumothorax, infiltrate, pleural effusion, pulmonary edema, visible rib fracture, cardiomegaly, or mediastinal widening.  EKG from today shows sinus rhythm, rate of 81, no acute concerning ST or T wave changes    ED Course      Medications Administered   Medications   nitroGLYcerin (NITROSTAT) sublingual tablet 0.4 mg (0.4 mg Sublingual $Given 10/16/24 1057)       Procedures   Procedures     Discussion of Management   None    ED Course   ED Course as of 10/16/24 1626   Wed Oct 16, 2024   1011 I obtained history and performed physical exam as noted above.    1130 I rechecked the patient.   1226 I rechecked the patient.   1322 I updated the patient and prepared him for discharge.       Additional Documentation  None    Medical Decision Making / Diagnosis      CMS Diagnoses: None    MIPS       None    MDM   Darien Johnson is a 54 year old male who presents with the above-mentioned complaints.  A broad differential was considered including PE given the tachycardia.  However his D-dimer came back normal making PE unlikely.  He is not hypoxic nor is he tachycardic here.  Acute coronary syndrome was also considered.  Fortune his EKG showed no signs of ischemia and his initial and delta troponin were both less than 6 make acute course syndrome very unlikely.  Chest x-ray obtained and shows no signs of pneumothorax, pneumonia, effusion, dissection or any other acute pathology.  TSH was checked and normal.  Comprehensive metabolic panel and CBC were both unremarkable.  He was given nitro early on the course which did seem to take the edge off the pain.  After he completed the x-ray had been here for several hours his symptoms have dissipated and were no longer present.  I did order a Zio patch which he received in the mail.  I have also requested he follow-up with his primary care doctor and they can consider whether or not he should have a cardiac stress test.  Reasons to return to the emergency were discussed and he was discharged to home.    Disposition   The patient was discharged.     Diagnosis     ICD-10-CM    1. Chest pain, unspecified type  R07.9       2. SOB (shortness of breath)  R06.02       3. Tachycardia  R00.0 ZIO PATCH MAIL OUT           Discharge Medications   Discharge Medication List as of 10/16/2024  1:31 PM            Scribe Disclosure:  Laura TELLO, am serving as a scribe at 10:18 AM on 10/16/2024 to document services personally performed by Mehdi Schmidt MD based on my observations and the provider's statements to me.        Mehdi Schmidt MD  10/16/24 0773

## 2024-11-13 LAB — CV ZIO PRELIM RESULTS: NORMAL

## 2025-02-23 ENCOUNTER — HEALTH MAINTENANCE LETTER (OUTPATIENT)
Age: 55
End: 2025-02-23